# Patient Record
Sex: MALE | Race: WHITE | NOT HISPANIC OR LATINO | Employment: OTHER | URBAN - NONMETROPOLITAN AREA
[De-identification: names, ages, dates, MRNs, and addresses within clinical notes are randomized per-mention and may not be internally consistent; named-entity substitution may affect disease eponyms.]

---

## 2023-09-21 ENCOUNTER — OFFICE VISIT (OUTPATIENT)
Dept: CARDIOLOGY | Facility: CLINIC | Age: 83
End: 2023-09-21
Payer: MEDICARE

## 2023-09-21 VITALS
SYSTOLIC BLOOD PRESSURE: 160 MMHG | HEART RATE: 51 BPM | HEIGHT: 73 IN | BODY MASS INDEX: 30.35 KG/M2 | DIASTOLIC BLOOD PRESSURE: 90 MMHG | WEIGHT: 229 LBS

## 2023-09-21 DIAGNOSIS — E88.81 METABOLIC SYNDROME: ICD-10-CM

## 2023-09-21 DIAGNOSIS — I10 PRIMARY HYPERTENSION: Primary | ICD-10-CM

## 2023-09-21 DIAGNOSIS — T50.905A BRADYCARDIA, DRUG INDUCED: ICD-10-CM

## 2023-09-21 DIAGNOSIS — R06.02 SHORTNESS OF BREATH: ICD-10-CM

## 2023-09-21 DIAGNOSIS — R00.1 BRADYCARDIA, DRUG INDUCED: ICD-10-CM

## 2023-09-21 DIAGNOSIS — R07.2 PRECORDIAL PAIN: ICD-10-CM

## 2023-09-21 DIAGNOSIS — E78.00 HYPERCHOLESTEROLEMIA: ICD-10-CM

## 2023-09-21 PROBLEM — E88.810 METABOLIC SYNDROME: Status: ACTIVE | Noted: 2023-09-21

## 2023-09-21 RX ORDER — DILTIAZEM HYDROCHLORIDE 360 MG/1
360 CAPSULE, EXTENDED RELEASE ORAL DAILY
COMMUNITY

## 2023-09-21 RX ORDER — CLONIDINE HYDROCHLORIDE 0.1 MG/1
0.2 TABLET ORAL DAILY
COMMUNITY
End: 2023-09-21

## 2023-09-21 RX ORDER — CLONIDINE HYDROCHLORIDE 0.1 MG/1
0.2 TABLET ORAL DAILY
Status: SHIPPED | COMMUNITY
Start: 2023-09-21

## 2023-09-21 RX ORDER — TRIAMTERENE AND HYDROCHLOROTHIAZIDE 75; 50 MG/1; MG/1
1 TABLET ORAL DAILY
COMMUNITY

## 2023-09-21 RX ORDER — VALSARTAN 320 MG/1
320 TABLET ORAL DAILY
COMMUNITY

## 2023-09-21 RX ORDER — MONTELUKAST SODIUM 10 MG/1
10 TABLET ORAL NIGHTLY
COMMUNITY

## 2023-09-21 RX ORDER — MINOXIDIL 10 MG/1
10 TABLET ORAL DAILY
Qty: 30 TABLET | Refills: 6 | Status: SHIPPED | OUTPATIENT
Start: 2023-09-21

## 2023-09-21 RX ORDER — ATORVASTATIN CALCIUM 10 MG/1
10 TABLET, FILM COATED ORAL DAILY
COMMUNITY

## 2023-09-21 RX ORDER — RAMIPRIL 10 MG/1
20 CAPSULE ORAL DAILY
COMMUNITY

## 2023-09-21 RX ORDER — ASPIRIN 81 MG/1
81 TABLET ORAL DAILY
COMMUNITY

## 2023-09-21 NOTE — PROGRESS NOTES
"Chief Complaint   Patient presents with    Establish Care     PCP referred, uncontrolled BP with history of bradycardia, was seen here several years ago.    Hypertension     Diagnosed with HTN in '87, has had numerous med regimens, PCP has gradually had to add and increase med. Last added Clonidine then increased it.  \" Dr Johnson said I was maxed out on my meds now\". Yesterday BP was 165/82 before medication, will go down to 150's/ 70's.     Weight Loss     Has been trying to loose weight.  Eating one meal a day. Has lost 10-12 lbs over the past month. Walking 3 miles daily.     Chest Pain     Can occur while walking. Sharp pain in left chest, lasts a few seconds. Will sometimes occur after laying down at night. Has had these symptoms for several years.     Shortness of Breath     With exertion    Cardiac history     Stress and echo in 2005, echo in 2012, no testing since 2012, has never had a renal artery US that he is aware of.     Slow Heart Rate     Consistently runs around 50.     Labs     Pt brought copy of most recent labs from July        CARDIAC COMPLAINTS  chest pressure/discomfort and dyspnea      Subjective   Blayne Hunt is a 83 y.o. male came in today for his initial cardiac evaluation.  He has history of hypertension, hypercholesterolemia, history of prostate cancer was seen in 2005 and again in 2012 for cardiac management.  He presented initially with chest pain and bradycardia and both his clonidine and Cardizem was stopped and he was put on Diovan and triamterene.  Later in 2012 he was referred back for chest pain and shortness of breath and found him to be bradycardic and also noted that he was on Cardizem again along with ACE inhibitors and an ARB.  He had an echocardiogram which showed LV function was normal.  He also has dilated aortic root.  His stress test done at that time also was normal.  I have not seen him since then.    About few months ago he started noticing increasing in his blood " pressure.  Clonidine was added and increased the dose but his blood pressure continued to be elevated.  He also continued to be bradycardic though he was asymptomatic.  He has been having some chest pain on exertion.  He was trying to lose weight and eats 1 meal a day and walks about 3 miles a day.  He has lost about 10 pounds over the last 1 month.  When he does his walking he has been noticing sharp pain in the left part of the chest lasting for few seconds which gets better after taking rest.  He also started noticing some mild increase in shortness of breath.  He did undergo lab work and he brought the results with him.  His blood sugar was 112.  His renal function was slightly abnormal with GFR of 56.  His cholesterol is 140 with the LDL of 88.  His blood count was normal.  His TSH was normal.  His PSA was normal.  He quit smoking in 1980.  His father had premature coronary artery disease.    Past Surgical History:   Procedure Laterality Date    CARDIOVASCULAR STRESS TEST  12/08/2005    @ [facility].10.38 Min. 244/78. R/O Septal Infarct.    CARDIOVASCULAR STRESS TEST  04/11/2012    9.30 Min. 13.5 METS. 87% THR. 190/72. EF > 60%. Negative    ECHO - CONVERTED  12/07/2005    @[facility]. EF 65%. AO- 3.8    ECHO - CONVERTED  03/15/2012    EF 65%. AO- 4.0       Current Outpatient Medications   Medication Sig Dispense Refill    aspirin 81 MG EC tablet Take 1 tablet by mouth Daily.      atorvastatin (LIPITOR) 10 MG tablet Take 1 tablet by mouth Daily.      cloNIDine (CATAPRES) 0.1 MG tablet Take 2 tablets by mouth Daily. Reduce to 1 Tab at night till SBP comes down to 130 and then stop      dilTIAZem CD (CARDIZEM CD) 360 MG 24 hr capsule Take 1 capsule by mouth Daily.      montelukast (SINGULAIR) 10 MG tablet Take 1 tablet by mouth Every Night.      ramipril (ALTACE) 10 MG capsule Take 2 capsules by mouth Daily.      triamterene-hydrochlorothiazide (MAXZIDE) 75-50 MG per tablet Take 1 tablet by mouth Daily.      valsartan  (DIOVAN) 320 MG tablet Take 1 tablet by mouth Daily.      minoxidil (LONITEN) 10 MG tablet Take 1 tablet by mouth Daily. 30 tablet 6     No current facility-administered medications for this visit.           ALLERGIES:  Patient has no known allergies.    Past Medical History:   Diagnosis Date    Cancer     History of foot surgery     Hx of prostatectomy     Hyperlipidemia     Hypertension     Rheumatic fever     Seasonal allergies        Social History     Tobacco Use   Smoking Status Former    Packs/day: 1.00    Years: 15.00    Pack years: 15.00    Types: Cigarettes    Quit date:     Years since quittin.7   Smokeless Tobacco Never          Family History   Problem Relation Age of Onset    Hodgkin's lymphoma Mother     Heart disease Father     Other Other         7 siblings, one sister has AFib    Other Other         grandparents medical history unknown    Hypertension Son     Hypertension Son     Hypertension Son        Review of Systems   Constitutional: Negative for decreased appetite and malaise/fatigue.   HENT:  Negative for congestion and sore throat.    Eyes:  Negative for blurred vision, double vision and visual disturbance.   Cardiovascular:  Positive for chest pain and dyspnea on exertion.   Respiratory:  Positive for shortness of breath. Negative for snoring.    Endocrine: Negative for cold intolerance and heat intolerance.   Hematologic/Lymphatic: Negative for adenopathy. Does not bruise/bleed easily.   Skin:  Negative for itching, nail changes and skin cancer.   Musculoskeletal:  Negative for arthritis and myalgias.   Gastrointestinal:  Negative for abdominal pain, dysphagia and heartburn.   Genitourinary:  Negative for bladder incontinence and frequency.   Neurological:  Negative for dizziness, seizures and vertigo.   Psychiatric/Behavioral:  Negative for altered mental status.    Allergic/Immunologic: Negative for environmental allergies and hives.     Diabetes- No  Thyroid-  "normal    Objective     /90 (BP Location: Left arm)   Pulse 51   Ht 185.4 cm (73\")   Wt 104 kg (229 lb)   BMI 30.21 kg/m²     Vitals and nursing note reviewed.   Constitutional:       Appearance: Healthy appearance. Not in distress.   Eyes:      Conjunctiva/sclera: Conjunctivae normal.      Pupils: Pupils are equal, round, and reactive to light.   HENT:      Head: Normocephalic.   Pulmonary:      Effort: Pulmonary effort is normal.      Breath sounds: Normal breath sounds.   Cardiovascular:      PMI at left midclavicular line. Bradycardia present. Regular rhythm.      Murmurs: There is a grade 3/6 high frequency blowing holosystolic murmur at the apex.   Abdominal:      General: Bowel sounds are normal.      Palpations: Abdomen is soft.   Musculoskeletal: Normal range of motion.      Cervical back: Normal range of motion and neck supple. Skin:     General: Skin is warm and dry.   Neurological:      Mental Status: Alert, oriented to person, place, and time and oriented to person, place and time.       ECG 12 Lead    Date/Time: 9/21/2023 12:45 PM  Performed by: Jillian Tipton MD  Authorized by: Jillian Tipton MD   Comparison: compared with previous ECG from 8/18/2023  Comparison to previous ECG: Axis is normal  Rhythm: sinus bradycardia  Rate: bradycardic  Q waves: II, aVF, V6 and V5    QRS axis: normal    Clinical impression: abnormal EKG        @ASSESSMENT/PLAN@  BMI is >= 30 and <35. (Class 1 Obesity). The following options were offered after discussion;: weight loss educational material (shared in after visit summary), exercise counseling/recommendations, and nutrition counseling/recommendations     Diagnoses and all orders for this visit:    1. Primary hypertension (Primary)  -     US Renal Artery Complete; Future  -     minoxidil (LONITEN) 10 MG tablet; Take 1 tablet by mouth Daily.  Dispense: 30 tablet; Refill: 6    2. Hypercholesterolemia    3. Precordial pain  -     Stress Test With " Myocardial Perfusion One Day; Future    4. Shortness of breath  -     Adult Transthoracic Echo Complete W/ Cont if Necessary Per Protocol; Future    5. Bradycardia, drug induced    6. Metabolic syndrome    At baseline is bradycardic.  His blood pressure is still markedly elevated.  His EKG shows sinus bradycardia Q waves in the anterolateral leads.  His clinical examination reveals BMI of 30.  His cardiovascular examination reveals systolic murmur at the mitral area with no significant edema.    Regarding his hypertension which is predominant problem it is still elevated in spite of taking Cardizem CD at 360, clonidine at 0.2 mg, Diovan at 320 and Maxzide at 75/50.  At this time I added minoxidil 10 mg once a day.  Advised him to reduce the dose of clonidine 2.1 mg.  Advised him to check his blood pressure every day.  Once his systolic blood pressure comes close to 130, then he is advised to stop clonidine.  I also talked with him and his wife about renal artery stenosis.  I advised him to have renal arterial Doppler ultrasound to rule it out    Regarding his history of hypercholesterolemia, he is on Lipitor and his cholesterol is well controlled so continue the same    Regarding the chest pain, it could be related to blood pressure but given his risk factors need to rule out CAD.  I scheduled him to undergo a stress test to evaluate his functional status, chronotropic response, blood pressure response and to rule out any stress-induced ischemia causing the problem    Regarding his shortness of breath, it could be related to his metabolic syndrome but need to rule out cardiac cause.  I scheduled him to undergo an echocardiogram to evaluate for LVH, LV function, aortic root measurement    Regarding his metabolic syndrome, talked with him about diet and weight reduction and gave him papers on Mediterranean diet    Regarding his bradycardia, once we stop the clonidine then we can try reducing the dose of Cardizem  CD.    Regarding advanced directive, talked to him and his wife about living will and power of .  I gave them booklets regarding that    Based on the results, further recommendations will be made               Electronically signed by Jillian Tipton MD September 21, 2023 12:31 EDT

## 2023-09-21 NOTE — LETTER
"September 21, 2023     Zander Johnson MD  [address] [place] [address]    Patient: Blayne Hunt   YOB: 1940   Date of Visit: 9/21/2023     Dear Zander Johnson MD:       Thank you for referring Blayne Hunt to me for evaluation. Below are the relevant portions of my assessment and plan of care.    If you have questions, please do not hesitate to call me. I look forward to following Blayne along with you.         Sincerely,        Jillian Tipton MD        CC: No Recipients    Jillian Tipton MD  09/21/23 1246  Sign when Signing Visit  Chief Complaint   Patient presents with   • Establish Care     PCP referred, uncontrolled BP with history of bradycardia, was seen here several years ago.   • Hypertension     Diagnosed with HTN in '87, has had numerous med regimens, PCP has gradually had to add and increase med. Last added Clonidine then increased it.  \" Dr Johnson said I was maxed out on my meds now\". Yesterday BP was 165/82 before medication, will go down to 150's/ 70's.    • Weight Loss     Has been trying to loose weight.  Eating one meal a day. Has lost 10-12 lbs over the past month. Walking 3 miles daily.    • Chest Pain     Can occur while walking. Sharp pain in left chest, lasts a few seconds. Will sometimes occur after laying down at night. Has had these symptoms for several years.    • Shortness of Breath     With exertion   • Cardiac history     Stress and echo in 2005, echo in 2012, no testing since 2012, has never had a renal artery US that he is aware of.    • Slow Heart Rate     Consistently runs around 50.    • Labs     Pt brought copy of most recent labs from July        CARDIAC COMPLAINTS  chest pressure/discomfort and dyspnea      Subjective   Blayne Hunt is a 83 y.o. male came in today for his initial cardiac evaluation.  He has history of hypertension, hypercholesterolemia, history of prostate cancer was seen in 2005 and again in 2012 for cardiac management.  He " presented initially with chest pain and bradycardia and both his clonidine and Cardizem was stopped and he was put on Diovan and triamterene.  Later in 2012 he was referred back for chest pain and shortness of breath and found him to be bradycardic and also noted that he was on Cardizem again along with ACE inhibitors and an ARB.  He had an echocardiogram which showed LV function was normal.  He also has dilated aortic root.  His stress test done at that time also was normal.  I have not seen him since then.    About few months ago he started noticing increasing in his blood pressure.  Clonidine was added and increased the dose but his blood pressure continued to be elevated.  He also continued to be bradycardic though he was asymptomatic.  He has been having some chest pain on exertion.  He was trying to lose weight and eats 1 meal a day and walks about 3 miles a day.  He has lost about 10 pounds over the last 1 month.  When he does his walking he has been noticing sharp pain in the left part of the chest lasting for few seconds which gets better after taking rest.  He also started noticing some mild increase in shortness of breath.  He did undergo lab work and he brought the results with him.  His blood sugar was 112.  His renal function was slightly abnormal with GFR of 56.  His cholesterol is 140 with the LDL of 88.  His blood count was normal.  His TSH was normal.  His PSA was normal.  He quit smoking in 1980.  His father had premature coronary artery disease.    Past Surgical History:   Procedure Laterality Date   • CARDIOVASCULAR STRESS TEST  12/08/2005    @ [facility].10.38 Min. 244/78. R/O Septal Infarct.   • CARDIOVASCULAR STRESS TEST  04/11/2012    9.30 Min. 13.5 METS. 87% THR. 190/72. EF > 60%. Negative   • ECHO - CONVERTED  12/07/2005    @[facility]. EF 65%. AO- 3.8   • ECHO - CONVERTED  03/15/2012    EF 65%. AO- 4.0       Current Outpatient Medications   Medication Sig Dispense Refill   • aspirin 81 MG EC tablet  Take 1 tablet by mouth Daily.     • atorvastatin (LIPITOR) 10 MG tablet Take 1 tablet by mouth Daily.     • cloNIDine (CATAPRES) 0.1 MG tablet Take 2 tablets by mouth Daily. Reduce to 1 Tab at night till SBP comes down to 130 and then stop     • dilTIAZem CD (CARDIZEM CD) 360 MG 24 hr capsule Take 1 capsule by mouth Daily.     • montelukast (SINGULAIR) 10 MG tablet Take 1 tablet by mouth Every Night.     • ramipril (ALTACE) 10 MG capsule Take 2 capsules by mouth Daily.     • triamterene-hydrochlorothiazide (MAXZIDE) 75-50 MG per tablet Take 1 tablet by mouth Daily.     • valsartan (DIOVAN) 320 MG tablet Take 1 tablet by mouth Daily.     • minoxidil (LONITEN) 10 MG tablet Take 1 tablet by mouth Daily. 30 tablet 6     No current facility-administered medications for this visit.           ALLERGIES:  Patient has no known allergies.    Past Medical History:   Diagnosis Date   • Cancer    • History of foot surgery    • Hx of prostatectomy    • Hyperlipidemia    • Hypertension    • Rheumatic fever    • Seasonal allergies        Social History     Tobacco Use   Smoking Status Former   • Packs/day: 1.00   • Years: 15.00   • Pack years: 15.00   • Types: Cigarettes   • Quit date:    • Years since quittin.7   Smokeless Tobacco Never          Family History   Problem Relation Age of Onset   • Hodgkin's lymphoma Mother    • Heart disease Father    • Other Other         7 siblings, one sister has AFib   • Other Other         grandparents medical history unknown   • Hypertension Son    • Hypertension Son    • Hypertension Son        Review of Systems   Constitutional: Negative for decreased appetite and malaise/fatigue.   HENT:  Negative for congestion and sore throat.    Eyes:  Negative for blurred vision, double vision and visual disturbance.   Cardiovascular:  Positive for chest pain and dyspnea on exertion.   Respiratory:  Positive for shortness of breath. Negative for snoring.    Endocrine: Negative for cold  "intolerance and heat intolerance.   Hematologic/Lymphatic: Negative for adenopathy. Does not bruise/bleed easily.   Skin:  Negative for itching, nail changes and skin cancer.   Musculoskeletal:  Negative for arthritis and myalgias.   Gastrointestinal:  Negative for abdominal pain, dysphagia and heartburn.   Genitourinary:  Negative for bladder incontinence and frequency.   Neurological:  Negative for dizziness, seizures and vertigo.   Psychiatric/Behavioral:  Negative for altered mental status.    Allergic/Immunologic: Negative for environmental allergies and hives.     Diabetes- No  Thyroid- normal    Objective     /90 (BP Location: Left arm)   Pulse 51   Ht 185.4 cm (73\")   Wt 104 kg (229 lb)   BMI 30.21 kg/m²     Vitals and nursing note reviewed.   Constitutional:       Appearance: Healthy appearance. Not in distress.   Eyes:      Conjunctiva/sclera: Conjunctivae normal.      Pupils: Pupils are equal, round, and reactive to light.   HENT:      Head: Normocephalic.   Pulmonary:      Effort: Pulmonary effort is normal.      Breath sounds: Normal breath sounds.   Cardiovascular:      PMI at left midclavicular line. Bradycardia present. Regular rhythm.      Murmurs: There is a grade 3/6 high frequency blowing holosystolic murmur at the apex.   Abdominal:      General: Bowel sounds are normal.      Palpations: Abdomen is soft.   Musculoskeletal: Normal range of motion.      Cervical back: Normal range of motion and neck supple. Skin:     General: Skin is warm and dry.   Neurological:      Mental Status: Alert, oriented to person, place, and time and oriented to person, place and time.       ECG 12 Lead    Date/Time: 9/21/2023 12:45 PM  Performed by: Jillian Tipton MD  Authorized by: Jillian Tipton MD   Comparison: compared with previous ECG from 8/18/2023  Comparison to previous ECG: Axis is normal  Rhythm: sinus bradycardia  Rate: bradycardic  Q waves: II, aVF, V6 and V5    QRS axis: " normal    Clinical impression: abnormal EKG        @ASSESSMENT/PLAN@  BMI is >= 30 and <35. (Class 1 Obesity). The following options were offered after discussion;: weight loss educational material (shared in after visit summary), exercise counseling/recommendations, and nutrition counseling/recommendations     Diagnoses and all orders for this visit:    1. Primary hypertension (Primary)  -     US Renal Artery Complete; Future  -     minoxidil (LONITEN) 10 MG tablet; Take 1 tablet by mouth Daily.  Dispense: 30 tablet; Refill: 6    2. Hypercholesterolemia    3. Precordial pain  -     Stress Test With Myocardial Perfusion One Day; Future    4. Shortness of breath  -     Adult Transthoracic Echo Complete W/ Cont if Necessary Per Protocol; Future    5. Bradycardia, drug induced    6. Metabolic syndrome    At baseline is bradycardic.  His blood pressure is still markedly elevated.  His EKG shows sinus bradycardia Q waves in the anterolateral leads.  His clinical examination reveals BMI of 30.  His cardiovascular examination reveals systolic murmur at the mitral area with no significant edema.    Regarding his hypertension which is predominant problem it is still elevated in spite of taking Cardizem CD at 360, clonidine at 0.2 mg, Diovan at 320 and Maxzide at 75/50.  At this time I added minoxidil 10 mg once a day.  Advised him to reduce the dose of clonidine 2.1 mg.  Advised him to check his blood pressure every day.  Once his systolic blood pressure comes close to 130, then he is advised to stop clonidine.  I also talked with him and his wife about renal artery stenosis.  I advised him to have renal arterial Doppler ultrasound to rule it out    Regarding his history of hypercholesterolemia, he is on Lipitor and his cholesterol is well controlled so continue the same    Regarding the chest pain, it could be related to blood pressure but given his risk factors need to rule out CAD.  I scheduled him to undergo a stress  test to evaluate his functional status, chronotropic response, blood pressure response and to rule out any stress-induced ischemia causing the problem    Regarding his shortness of breath, it could be related to his metabolic syndrome but need to rule out cardiac cause.  I scheduled him to undergo an echocardiogram to evaluate for LVH, LV function, aortic root measurement    Regarding his metabolic syndrome, talked with him about diet and weight reduction and gave him papers on Mediterranean diet    Regarding his bradycardia, once we stop the clonidine then we can try reducing the dose of Cardizem CD.    Regarding advanced directive, talked to him and his wife about living will and power of .  I gave them booklets regarding that    Based on the results, further recommendations will be made               Electronically signed by Jillian Tipton MD September 21, 2023 12:31 EDT

## 2023-09-28 ENCOUNTER — HOSPITAL ENCOUNTER (OUTPATIENT)
Dept: CARDIOLOGY | Facility: HOSPITAL | Age: 83
Discharge: HOME | End: 2023-09-28
Payer: MEDICARE

## 2023-09-28 DIAGNOSIS — R07.2 PRECORDIAL PAIN: ICD-10-CM

## 2023-09-28 DIAGNOSIS — I10 PRIMARY HYPERTENSION: ICD-10-CM

## 2023-09-28 DIAGNOSIS — R06.02 SHORTNESS OF BREATH: ICD-10-CM

## 2023-09-28 PROCEDURE — 93306 TTE W/DOPPLER COMPLETE: CPT

## 2023-09-28 PROCEDURE — A9500 TC99M SESTAMIBI: HCPCS | Performed by: INTERNAL MEDICINE

## 2023-09-28 PROCEDURE — 0 TECHNETIUM SESTAMIBI: Performed by: INTERNAL MEDICINE

## 2023-09-28 PROCEDURE — 93017 CV STRESS TEST TRACING ONLY: CPT

## 2023-09-28 PROCEDURE — 93975 VASCULAR STUDY: CPT

## 2023-09-28 PROCEDURE — 78452 HT MUSCLE IMAGE SPECT MULT: CPT

## 2023-09-28 RX ADMIN — TECHNETIUM TC 99M SESTAMIBI 1 DOSE: 1 INJECTION INTRAVENOUS at 10:15

## 2023-09-28 RX ADMIN — TECHNETIUM TC 99M SESTAMIBI 1 DOSE: 1 INJECTION INTRAVENOUS at 12:08

## 2023-09-29 DIAGNOSIS — I70.1 RENAL ARTERY STENOSIS: ICD-10-CM

## 2023-09-29 DIAGNOSIS — I10 PRIMARY HYPERTENSION: Primary | ICD-10-CM

## 2023-09-29 LAB
BH CV ECHO MEAS - ACS: 2.2 CM
BH CV ECHO MEAS - AO MAX PG: 12.3 MMHG
BH CV ECHO MEAS - AO MEAN PG: 7 MMHG
BH CV ECHO MEAS - AO ROOT DIAM: 4.1 CM
BH CV ECHO MEAS - AO V2 MAX: 175 CM/SEC
BH CV ECHO MEAS - AO V2 VTI: 38.8 CM
BH CV ECHO MEAS - EDV(CUBED): 64 ML
BH CV ECHO MEAS - EDV(MOD-SP4): 105 ML
BH CV ECHO MEAS - EF(MOD-SP4): 55.8 %
BH CV ECHO MEAS - ESV(CUBED): 10.8 ML
BH CV ECHO MEAS - ESV(MOD-SP4): 46.4 ML
BH CV ECHO MEAS - FS: 44.7 %
BH CV ECHO MEAS - IVS/LVPW: 1.24 CM
BH CV ECHO MEAS - IVSD: 1.86 CM
BH CV ECHO MEAS - LA DIMENSION: 3.9 CM
BH CV ECHO MEAS - LAT PEAK E' VEL: 7.5 CM/SEC
BH CV ECHO MEAS - LV DIASTOLIC VOL/BSA (35-75): 46.1 CM2
BH CV ECHO MEAS - LV MASS(C)D: 279.1 GRAMS
BH CV ECHO MEAS - LV SYSTOLIC VOL/BSA (12-30): 20.4 CM2
BH CV ECHO MEAS - LVIDD: 4 CM
BH CV ECHO MEAS - LVIDS: 2.21 CM
BH CV ECHO MEAS - LVPWD: 1.5 CM
BH CV ECHO MEAS - MED PEAK E' VEL: 5.1 CM/SEC
BH CV ECHO MEAS - MV A MAX VEL: 111 CM/SEC
BH CV ECHO MEAS - MV DEC TIME: 0.28 SEC
BH CV ECHO MEAS - MV E MAX VEL: 70.3 CM/SEC
BH CV ECHO MEAS - MV E/A: 0.63
BH CV ECHO MEAS - RAP SYSTOLE: 10 MMHG
BH CV ECHO MEAS - RVSP: 19.7 MMHG
BH CV ECHO MEAS - SI(MOD-SP4): 25.7 ML/M2
BH CV ECHO MEAS - SV(MOD-SP4): 58.6 ML
BH CV ECHO MEAS - TR MAX PG: 9.7 MMHG
BH CV ECHO MEAS - TR MAX VEL: 156.1 CM/SEC
BH CV ECHO MEASUREMENTS AVERAGE E/E' RATIO: 11.16
BH CV REST NUCLEAR ISOTOPE DOSE: 10 MCI
BH CV STRESS COMMENTS STAGE 1: NORMAL
BH CV STRESS DOSE REGADENOSON STAGE 1: 0.4
BH CV STRESS DURATION MIN STAGE 1: 0
BH CV STRESS DURATION SEC STAGE 1: 10
BH CV STRESS NUCLEAR ISOTOPE DOSE: 30 MCI
BH CV STRESS PROTOCOL 1: NORMAL
BH CV STRESS RECOVERY BP: NORMAL MMHG
BH CV STRESS RECOVERY HR: 62 BPM
BH CV STRESS STAGE 1: 1
BH CV XLRA - RV BASE: 4.3 CM
BH CV XLRA - RV LENGTH: 10.6 CM
BH CV XLRA - RV MID: 3.3 CM
LEFT ATRIUM VOLUME INDEX: 18.4 ML/M2
LV EF NUC BP: 63 %
MAXIMAL PREDICTED HEART RATE: 139 BPM
PERCENT MAX PREDICTED HR: 47.48 %
STRESS BASELINE BP: NORMAL MMHG
STRESS BASELINE HR: 55 BPM
STRESS PERCENT HR: 56 %
STRESS POST PEAK BP: NORMAL MMHG
STRESS POST PEAK HR: 66 BPM
STRESS TARGET HR: 118 BPM

## 2023-10-16 ENCOUNTER — TELEPHONE (OUTPATIENT)
Dept: CARDIOLOGY | Facility: CLINIC | Age: 83
End: 2023-10-16
Payer: MEDICARE

## 2023-10-16 DIAGNOSIS — I10 PRIMARY HYPERTENSION: Primary | ICD-10-CM

## 2023-10-16 DIAGNOSIS — I70.1 RENAL ARTERY STENOSIS: ICD-10-CM

## 2023-10-16 NOTE — TELEPHONE ENCOUNTER
Lab order along with order for CTA and authorization was faxed to [facility].

## 2023-10-23 ENCOUNTER — TELEPHONE (OUTPATIENT)
Dept: CARDIOLOGY | Facility: CLINIC | Age: 83
End: 2023-10-23
Payer: MEDICARE

## 2023-10-23 NOTE — TELEPHONE ENCOUNTER
Caller: ROBERT RODARTE    Relationship: Emergency Contact    Best call back number: [phone]     What is the best time to reach you: ANYTIME     Do you know the name of the person who called: NILAY     What was the call regarding: PT REPORTS THAT THEY WERE GIVEN A CALL ON FRIDAY  BUT THERE IS NO DOCUMENTATION IN THE CHART ABOUT THIS. IF THIS WAS NOT A MISTAKE, PLEASE CALL PT BACK.     STATES THIS MAY HAVE BEEN ABOUT TEST RESULTS.     Is it okay if the provider responds through MyChart: CALL

## 2023-12-14 ENCOUNTER — OFFICE VISIT (OUTPATIENT)
Dept: CARDIOLOGY | Facility: CLINIC | Age: 83
End: 2023-12-14
Payer: MEDICARE

## 2023-12-14 VITALS
DIASTOLIC BLOOD PRESSURE: 80 MMHG | HEIGHT: 73 IN | SYSTOLIC BLOOD PRESSURE: 148 MMHG | BODY MASS INDEX: 32.26 KG/M2 | WEIGHT: 243.4 LBS | HEART RATE: 56 BPM

## 2023-12-14 DIAGNOSIS — T50.905A BRADYCARDIA, DRUG INDUCED: ICD-10-CM

## 2023-12-14 DIAGNOSIS — E78.00 HYPERCHOLESTEROLEMIA: ICD-10-CM

## 2023-12-14 DIAGNOSIS — R00.1 BRADYCARDIA, DRUG INDUCED: ICD-10-CM

## 2023-12-14 DIAGNOSIS — R06.02 SHORTNESS OF BREATH: ICD-10-CM

## 2023-12-14 DIAGNOSIS — E88.810 METABOLIC SYNDROME: ICD-10-CM

## 2023-12-14 DIAGNOSIS — R07.2 PRECORDIAL PAIN: ICD-10-CM

## 2023-12-14 DIAGNOSIS — I10 PRIMARY HYPERTENSION: Primary | ICD-10-CM

## 2023-12-14 RX ORDER — ATORVASTATIN CALCIUM 10 MG/1
10 TABLET, FILM COATED ORAL DAILY
Qty: 90 TABLET | Refills: 3 | Status: SHIPPED | OUTPATIENT
Start: 2023-12-14

## 2023-12-14 RX ORDER — TRIAMTERENE AND HYDROCHLOROTHIAZIDE 75; 50 MG/1; MG/1
1 TABLET ORAL DAILY
Qty: 90 TABLET | Refills: 3 | Status: SHIPPED | OUTPATIENT
Start: 2023-12-14

## 2023-12-14 RX ORDER — RAMIPRIL 10 MG/1
20 CAPSULE ORAL DAILY
Qty: 90 CAPSULE | Refills: 3 | Status: SHIPPED | OUTPATIENT
Start: 2023-12-14

## 2023-12-14 RX ORDER — VALSARTAN 320 MG/1
320 TABLET ORAL DAILY
Qty: 90 TABLET | Refills: 3 | Status: SHIPPED | OUTPATIENT
Start: 2023-12-14

## 2023-12-14 RX ORDER — MONTELUKAST SODIUM 10 MG/1
10 TABLET ORAL NIGHTLY
Qty: 90 TABLET | Refills: 3 | Status: SHIPPED | OUTPATIENT
Start: 2023-12-14

## 2023-12-14 RX ORDER — MINOXIDIL 10 MG/1
10 TABLET ORAL 2 TIMES DAILY
Qty: 180 TABLET | Refills: 3 | Status: SHIPPED | OUTPATIENT
Start: 2023-12-14 | End: 2023-12-18 | Stop reason: DRUGHIGH

## 2023-12-14 RX ORDER — DILTIAZEM HYDROCHLORIDE 180 MG/1
180 CAPSULE, COATED, EXTENDED RELEASE ORAL DAILY
Qty: 90 CAPSULE | Refills: 3 | Status: SHIPPED | OUTPATIENT
Start: 2023-12-14 | End: 2023-12-18 | Stop reason: ALTCHOICE

## 2023-12-14 NOTE — PROGRESS NOTES
Chief Complaint   Patient presents with   • Follow-up     Pt is here for cardiac follow up.  Pt reports occasional sharp CP.  He states is resolves quickly.  He denies SOB, dizziness or palpitations.  Pt does take a daily ASA.     • Med Refill     Pt request 90 day refills to be sent to [facility].  Medications were verified by the pt.     • Lab Work     Pt states their last labs were in July with his PCP.         CARDIAC COMPLAINTS  chest pressure/discomfort        Subjective   Blayne Hunt is a 83 y.o. male came in today for his follow-up visit.  He was referred to us for uncontrolled hypertension.  He also was having chest pain and fatigue.  He was noted to be bradycardic.  I started him on minoxidil and started tapering clonidine.  He is completely off clonidine.  He is cardiac workup includes a stress test which did not show any ischemia.  His echocardiogram was normal.  His renal artery ultrasound showed possible left renal artery stenosis but the CTA showed it was normal.  He came today with no new symptoms.  His blood pressure is doing better but not completely normal.  He still has some occasional chest pain when the blood pressure goes up.              Cardiac History  Past Surgical History:   Procedure Laterality Date   • CARDIOVASCULAR STRESS TEST  12/08/2005    @ [facility].10.38 Min. 244/78. R/O Septal Infarct.   • CARDIOVASCULAR STRESS TEST  04/11/2012    9.30 Min. 13.5 METS. 87% THR. 190/72. EF > 60%. Negative   • CARDIOVASCULAR STRESS TEST  09/28/2023    Lexiscan- EF 63%. Diapragmatic attenuation   • ECHO - CONVERTED  12/07/2005    @[facility]. EF 65%. AO- 3.8   • ECHO - CONVERTED  03/15/2012    EF 65%. AO- 4.0   • ECHO - CONVERTED  09/28/2023    TLS. EF 60%. LA- 3.9. AO- 4.1. Trace-Mild MR. RVSP- 20 mmHg   • OTHER SURGICAL HISTORY  09/28/2023    Renal Artery US- R/O (L) Renal artery stenosis   • OTHER SURGICAL HISTORY  10/17/2023    CTA- No RA Stenosis. Gall stones       Current Outpatient Medications    Medication Sig Dispense Refill   • aspirin 81 MG EC tablet Take 1 tablet by mouth Daily.     • atorvastatin (LIPITOR) 10 MG tablet Take 1 tablet by mouth Daily. 90 tablet 3   • minoxidil (LONITEN) 10 MG tablet Take 1 tablet by mouth 2 (Two) Times a Day. 180 tablet 3   • montelukast (SINGULAIR) 10 MG tablet Take 1 tablet by mouth Every Night. 90 tablet 3   • ramipril (ALTACE) 10 MG capsule Take 2 capsules by mouth Daily. 90 capsule 3   • triamterene-hydrochlorothiazide (MAXZIDE) 75-50 MG per tablet Take 1 tablet by mouth Daily. 90 tablet 3   • valsartan (DIOVAN) 320 MG tablet Take 1 tablet by mouth Daily. 90 tablet 3   • dilTIAZem CD (Cardizem CD) 180 MG 24 hr capsule Take 1 capsule by mouth Daily. 90 capsule 3     No current facility-administered medications for this visit.       Allergies  :  Patient has no known allergies.       Past Medical History:   Diagnosis Date   • Cancer    • History of foot surgery    • Hx of prostatectomy    • Hyperlipidemia    • Hypertension    • Rheumatic fever    • Seasonal allergies        Social History     Socioeconomic History   • Marital status:    Tobacco Use   • Smoking status: Former     Packs/day: 1.00     Years: 15.00     Additional pack years: 0.00     Total pack years: 15.00     Types: Cigarettes     Quit date: 1980     Years since quittin.9   • Smokeless tobacco: Never   Vaping Use   • Vaping Use: Never used   Substance and Sexual Activity   • Alcohol use: Not Currently   • Drug use: Never       Family History   Problem Relation Age of Onset   • Hodgkin's lymphoma Mother    • Heart disease Father    • Other Other         7 siblings, one sister has AFib   • Other Other         grandparents medical history unknown   • Hypertension Son    • Hypertension Son    • Hypertension Son        Review of Systems   Constitutional: Negative for decreased appetite and malaise/fatigue.   HENT:  Negative for congestion and sore throat.    Eyes:  Negative for blurred  "vision, double vision and visual disturbance.   Cardiovascular:  Positive for chest pain. Negative for dyspnea on exertion and palpitations.   Respiratory:  Negative for shortness of breath and snoring.    Endocrine: Negative for cold intolerance and heat intolerance.   Hematologic/Lymphatic: Negative for adenopathy. Does not bruise/bleed easily.   Skin:  Negative for itching, nail changes and skin cancer.   Musculoskeletal:  Negative for arthritis and myalgias.   Gastrointestinal:  Negative for abdominal pain, dysphagia and heartburn.   Genitourinary:  Negative for bladder incontinence and frequency.   Neurological:  Negative for dizziness, seizures and vertigo.   Psychiatric/Behavioral:  Negative for altered mental status.    Allergic/Immunologic: Negative for environmental allergies and hives.     Diabetes- No  Thyroid- normal    Objective     /80 (BP Location: Left arm, Patient Position: Sitting)   Pulse 56   Ht 185.4 cm (73\")   Wt 110 kg (243 lb 6.4 oz)   BMI 32.11 kg/m²     Vitals and nursing note reviewed.   Constitutional:       Appearance: Healthy appearance. Not in distress.   Eyes:      Conjunctiva/sclera: Conjunctivae normal.      Pupils: Pupils are equal, round, and reactive to light.   HENT:      Head: Normocephalic.   Pulmonary:      Effort: Pulmonary effort is normal.      Breath sounds: Normal breath sounds.   Cardiovascular:      PMI at left midclavicular line. Bradycardia present. Regular rhythm.      Murmurs: There is a grade 2/6 high frequency blowing holosystolic murmur at the apex.   Abdominal:      General: Bowel sounds are normal.      Palpations: Abdomen is soft.   Musculoskeletal: Normal range of motion.      Cervical back: Normal range of motion and neck supple. Skin:     General: Skin is warm and dry.   Neurological:      Mental Status: Alert, oriented to person, place, and time and oriented to person, place and time.   Procedures            @ASSESSMENT/PLAN@        Diagnoses " and all orders for this visit:    1. Primary hypertension (Primary)  -     minoxidil (LONITEN) 10 MG tablet; Take 1 tablet by mouth 2 (Two) Times a Day.  Dispense: 180 tablet; Refill: 3  -     ramipril (ALTACE) 10 MG capsule; Take 2 capsules by mouth Daily.  Dispense: 90 capsule; Refill: 3  -     triamterene-hydrochlorothiazide (MAXZIDE) 75-50 MG per tablet; Take 1 tablet by mouth Daily.  Dispense: 90 tablet; Refill: 3  -     valsartan (DIOVAN) 320 MG tablet; Take 1 tablet by mouth Daily.  Dispense: 90 tablet; Refill: 3  -     dilTIAZem CD (Cardizem CD) 180 MG 24 hr capsule; Take 1 capsule by mouth Daily.  Dispense: 90 capsule; Refill: 3    2. Hypercholesterolemia  -     atorvastatin (LIPITOR) 10 MG tablet; Take 1 tablet by mouth Daily.  Dispense: 90 tablet; Refill: 3    3. Precordial pain    4. Metabolic syndrome    5. Shortness of breath  -     montelukast (SINGULAIR) 10 MG tablet; Take 1 tablet by mouth Every Night.  Dispense: 90 tablet; Refill: 3    6. Bradycardia, drug induced  -     dilTIAZem CD (Cardizem CD) 180 MG 24 hr capsule; Take 1 capsule by mouth Daily.  Dispense: 90 capsule; Refill: 3       At baseline his heart rate is still low but better than before.  His blood pressure is mildly elevated.  His BMI is still around 32.  His clinical examination is otherwise unremarkable    Regarding his hypertension, I increased the dose of minoxidil to twice a day.  We can increase it to 20 mg twice a day if needed.  Meanwhile continue Altace, Maxide and Diovan.  Regarding Cardizem CD I reduce it to 180 mg.  He is advised to call our office on Monday with his blood pressure    Regarding his hypercholesterolemia, he is on Lipitor.  Continue the same.  Need to recheck it along with LFT    Regarding the chest pain, it appears to be secondary to hypertension itself.  The frequency and intensity of the pain has come down    Regarding metabolic syndrome, talked to him again about diet    Regarding his history of  shortness of breath, he does have some mild asthma and will continue the singular    Regarding his bradycardia which is slowly getting better.  Hopefully we should be able to taper and stop the Cardizem CD    Overall cardiac status appears stable.  I will see him back in 6 months or sooner if needed                  Electronically signed by Jillian Tipton MD December 14, 2023 14:08 EST

## 2023-12-14 NOTE — LETTER
December 14, 2023       No Recipients    Patient: Blayne Hunt   YOB: 1940   Date of Visit: 12/14/2023     Dear Zander Johnson MD:       Thank you for referring Blayne Hunt to me for evaluation. Below are the relevant portions of my assessment and plan of care.    If you have questions, please do not hesitate to call me. I look forward to following Blayne along with you.         Sincerely,        Jillian Tipton MD        CC:   No Recipients    Jillian Tipton MD  12/14/23 1412  Sign when Signing Visit  Chief Complaint   Patient presents with   • Follow-up     Pt is here for cardiac follow up.  Pt reports occasional sharp CP.  He states is resolves quickly.  He denies SOB, dizziness or palpitations.  Pt does take a daily ASA.     • Med Refill     Pt request 90 day refills to be sent to [facility].  Medications were verified by the pt.     • Lab Work     Pt states their last labs were in July with his PCP.         CARDIAC COMPLAINTS  chest pressure/discomfort        Subjective  Blayne Hunt is a 83 y.o. male came in today for his follow-up visit.  He was referred to us for uncontrolled hypertension.  He also was having chest pain and fatigue.  He was noted to be bradycardic.  I started him on minoxidil and started tapering clonidine.  He is completely off clonidine.  He is cardiac workup includes a stress test which did not show any ischemia.  His echocardiogram was normal.  His renal artery ultrasound showed possible left renal artery stenosis but the CTA showed it was normal.  He came today with no new symptoms.  His blood pressure is doing better but not completely normal.  He still has some occasional chest pain when the blood pressure goes up.              Cardiac History  Past Surgical History:   Procedure Laterality Date   • CARDIOVASCULAR STRESS TEST  12/08/2005    @ [facility].10.38 Min. 244/78. R/O Septal Infarct.   • CARDIOVASCULAR STRESS TEST  04/11/2012    9.30 Min. 13.5  METS. 87% THR. 190/72. EF > 60%. Negative   • CARDIOVASCULAR STRESS TEST  2023    Lexiscan- EF 63%. Diapragmatic attenuation   • ECHO - CONVERTED  2005    @[facility]. EF 65%. AO- 3.8   • ECHO - CONVERTED  03/15/2012    EF 65%. AO- 4.0   • ECHO - CONVERTED  2023    TLS. EF 60%. LA- 3.9. AO- 4.1. Trace-Mild MR. RVSP- 20 mmHg   • OTHER SURGICAL HISTORY  2023    Renal Artery US- R/O (L) Renal artery stenosis   • OTHER SURGICAL HISTORY  10/17/2023    CTA- No RA Stenosis. Gall stones       Current Outpatient Medications   Medication Sig Dispense Refill   • aspirin 81 MG EC tablet Take 1 tablet by mouth Daily.     • atorvastatin (LIPITOR) 10 MG tablet Take 1 tablet by mouth Daily. 90 tablet 3   • minoxidil (LONITEN) 10 MG tablet Take 1 tablet by mouth 2 (Two) Times a Day. 180 tablet 3   • montelukast (SINGULAIR) 10 MG tablet Take 1 tablet by mouth Every Night. 90 tablet 3   • ramipril (ALTACE) 10 MG capsule Take 2 capsules by mouth Daily. 90 capsule 3   • triamterene-hydrochlorothiazide (MAXZIDE) 75-50 MG per tablet Take 1 tablet by mouth Daily. 90 tablet 3   • valsartan (DIOVAN) 320 MG tablet Take 1 tablet by mouth Daily. 90 tablet 3   • dilTIAZem CD (Cardizem CD) 180 MG 24 hr capsule Take 1 capsule by mouth Daily. 90 capsule 3     No current facility-administered medications for this visit.       Allergies  :  Patient has no known allergies.       Past Medical History:   Diagnosis Date   • Cancer    • History of foot surgery    • Hx of prostatectomy    • Hyperlipidemia    • Hypertension    • Rheumatic fever    • Seasonal allergies        Social History     Socioeconomic History   • Marital status:    Tobacco Use   • Smoking status: Former     Packs/day: 1.00     Years: 15.00     Additional pack years: 0.00     Total pack years: 15.00     Types: Cigarettes     Quit date: 1980     Years since quittin.9   • Smokeless tobacco: Never   Vaping Use   • Vaping Use: Never used   Substance and  "Sexual Activity   • Alcohol use: Not Currently   • Drug use: Never       Family History   Problem Relation Age of Onset   • Hodgkin's lymphoma Mother    • Heart disease Father    • Other Other         7 siblings, one sister has AFib   • Other Other         grandparents medical history unknown   • Hypertension Son    • Hypertension Son    • Hypertension Son        Review of Systems   Constitutional: Negative for decreased appetite and malaise/fatigue.   HENT:  Negative for congestion and sore throat.    Eyes:  Negative for blurred vision, double vision and visual disturbance.   Cardiovascular:  Positive for chest pain. Negative for dyspnea on exertion and palpitations.   Respiratory:  Negative for shortness of breath and snoring.    Endocrine: Negative for cold intolerance and heat intolerance.   Hematologic/Lymphatic: Negative for adenopathy. Does not bruise/bleed easily.   Skin:  Negative for itching, nail changes and skin cancer.   Musculoskeletal:  Negative for arthritis and myalgias.   Gastrointestinal:  Negative for abdominal pain, dysphagia and heartburn.   Genitourinary:  Negative for bladder incontinence and frequency.   Neurological:  Negative for dizziness, seizures and vertigo.   Psychiatric/Behavioral:  Negative for altered mental status.    Allergic/Immunologic: Negative for environmental allergies and hives.     Diabetes- No  Thyroid- normal    Objective    /80 (BP Location: Left arm, Patient Position: Sitting)   Pulse 56   Ht 185.4 cm (73\")   Wt 110 kg (243 lb 6.4 oz)   BMI 32.11 kg/m²     Vitals and nursing note reviewed.   Constitutional:       Appearance: Healthy appearance. Not in distress.   Eyes:      Conjunctiva/sclera: Conjunctivae normal.      Pupils: Pupils are equal, round, and reactive to light.   HENT:      Head: Normocephalic.   Pulmonary:      Effort: Pulmonary effort is normal.      Breath sounds: Normal breath sounds.   Cardiovascular:      PMI at left midclavicular line. " Bradycardia present. Regular rhythm.      Murmurs: There is a grade 2/6 high frequency blowing holosystolic murmur at the apex.   Abdominal:      General: Bowel sounds are normal.      Palpations: Abdomen is soft.   Musculoskeletal: Normal range of motion.      Cervical back: Normal range of motion and neck supple. Skin:     General: Skin is warm and dry.   Neurological:      Mental Status: Alert, oriented to person, place, and time and oriented to person, place and time.   Procedures            @ASSESSMENT/PLAN@        Diagnoses and all orders for this visit:    1. Primary hypertension (Primary)  -     minoxidil (LONITEN) 10 MG tablet; Take 1 tablet by mouth 2 (Two) Times a Day.  Dispense: 180 tablet; Refill: 3  -     ramipril (ALTACE) 10 MG capsule; Take 2 capsules by mouth Daily.  Dispense: 90 capsule; Refill: 3  -     triamterene-hydrochlorothiazide (MAXZIDE) 75-50 MG per tablet; Take 1 tablet by mouth Daily.  Dispense: 90 tablet; Refill: 3  -     valsartan (DIOVAN) 320 MG tablet; Take 1 tablet by mouth Daily.  Dispense: 90 tablet; Refill: 3  -     dilTIAZem CD (Cardizem CD) 180 MG 24 hr capsule; Take 1 capsule by mouth Daily.  Dispense: 90 capsule; Refill: 3    2. Hypercholesterolemia  -     atorvastatin (LIPITOR) 10 MG tablet; Take 1 tablet by mouth Daily.  Dispense: 90 tablet; Refill: 3    3. Precordial pain    4. Metabolic syndrome    5. Shortness of breath  -     montelukast (SINGULAIR) 10 MG tablet; Take 1 tablet by mouth Every Night.  Dispense: 90 tablet; Refill: 3    6. Bradycardia, drug induced  -     dilTIAZem CD (Cardizem CD) 180 MG 24 hr capsule; Take 1 capsule by mouth Daily.  Dispense: 90 capsule; Refill: 3       At baseline his heart rate is still low but better than before.  His blood pressure is mildly elevated.  His BMI is still around 32.  His clinical examination is otherwise unremarkable    Regarding his hypertension, I increased the dose of minoxidil to twice a day.  We can increase it to  20 mg twice a day if needed.  Meanwhile continue Altace, Maxide and Diovan.  Regarding Cardizem CD I reduce it to 180 mg.  He is advised to call our office on Monday with his blood pressure    Regarding his hypercholesterolemia, he is on Lipitor.  Continue the same.  Need to recheck it along with LFT    Regarding the chest pain, it appears to be secondary to hypertension itself.  The frequency and intensity of the pain has come down    Regarding metabolic syndrome, talked to him again about diet    Regarding his history of shortness of breath, he does have some mild asthma and will continue the singular    Regarding his bradycardia which is slowly getting better.  Hopefully we should be able to taper and stop the Cardizem CD    Overall cardiac status appears stable.  I will see him back in 6 months or sooner if needed                  Electronically signed by Jillian Tipton MD December 14, 2023 14:08 EST

## 2023-12-18 ENCOUNTER — TELEPHONE (OUTPATIENT)
Dept: CARDIOLOGY | Facility: CLINIC | Age: 83
End: 2023-12-18
Payer: MEDICARE

## 2023-12-18 RX ORDER — MINOXIDIL 10 MG/1
20 TABLET ORAL 2 TIMES DAILY
Qty: 360 TABLET | Refills: 3 | Status: SHIPPED | OUTPATIENT
Start: 2023-12-18

## 2023-12-18 NOTE — TELEPHONE ENCOUNTER
Patient's wife, Jessica, called to report BP readings since changing minoxidil to 10 mg BID and decreasing diltiazem CD to 180 mg daily.    Friday (12/15/23)  AM: 157/76  PM: 153/65    Saturday (12/16/23)  AM: 169/77  PM: 171/73    Sunday (12/17/23)  AM: 157/73   PM: 154/76    This morning BP was 153/69. She did have have specific HR readings, but she states that it has been in the 60s.     Current medications:  Minoxidil 10 mg BID  Diltiazem  mg daily  Ramipril 10 mg 2 capsules daily  Triamterene-HCTZ 75-50 mg daily  Valsartan 320 mg daily

## 2023-12-18 NOTE — TELEPHONE ENCOUNTER
Patient's wife, Jessica, was made aware to increase minoxidil 20 mg BID and to stop diltiazem  mg daily. Script was sent to [facility] for minoxidil 20 mg BID. She was made aware to continue checking BP at home and let us know how BP is after the first of the year.

## 2024-01-26 DIAGNOSIS — E88.810 METABOLIC SYNDROME: Primary | ICD-10-CM

## 2024-01-26 NOTE — TELEPHONE ENCOUNTER
Called Jessica, pt's wife back for further details. She said that over the past few weeks pt has had generalized edema with a weight gain of 13 lbs since being seen here Dec 14th. Has noticed a lot more SOB and having some soreness across his chest, not related to exertion. She states his BP is doing much better, consistently 140's/60's pulse 60's-70's. Jessica said she just wondered if it could be the increased dose of minoxidil. States it is working really well but wanted your thoughts on whether you think it could be the minoxidil.       Current meds include:    Minoxidil 20 mg BID  Altace 20 mg daily  Valsartan 320 mg daily   Triam/ HCTZ  75/50 mg daily

## 2024-01-26 NOTE — TELEPHONE ENCOUNTER
Patient wife called stating patient was having feet and eye swelling, weight gain, chest pain, and shortness of breath. Last blood pressure 144/64 and heart rate has been in upper 60's and 70's per patient wife.    Told patient wife to take patient to hospital if unstable and also that Svitlanali was out of office until Monday. Patient stated that patient would be okay until Monday.

## 2024-01-29 NOTE — TELEPHONE ENCOUNTER
I spoke to Jessica on Friday when the other note was written. She said that over the weekend his swelling has gotten much worse, feet are actually so swollen they are itching now. Pants are tight on his legs from the edema. Asking if the minoxidil could just be changed to something else of they feel he is going to have to go to ER.

## 2024-01-29 NOTE — TELEPHONE ENCOUNTER
Could be from minoxidil.  At this time low sodium diet will be helpful.  We can try 20 mg of Lasix a day.

## 2024-01-30 RX ORDER — SPIRONOLACTONE 50 MG/1
50 TABLET, FILM COATED ORAL DAILY
Qty: 90 TABLET | Refills: 3 | Status: SHIPPED | OUTPATIENT
Start: 2024-01-30

## 2024-01-30 NOTE — TELEPHONE ENCOUNTER
Pt's wife aware of recommendations to stop the minoxidil and start Aldactone 50 mg daily and check a BMP in 1 week as well as having a renal artery US and Venous reflux study. Pt has actually already had a renal artery US as well as a renal artery CTA. Reports in chart. Please add external venous reflux order.

## 2024-01-30 NOTE — TELEPHONE ENCOUNTER
Stop minoxidil  Start on Aldactone 50 once a day  Need BMP in 1 week  Patient also need renal artery ultrasound and venous reflux study

## 2024-02-08 ENCOUNTER — TELEPHONE (OUTPATIENT)
Dept: CARDIOLOGY | Facility: CLINIC | Age: 84
End: 2024-02-08
Payer: MEDICARE

## 2024-02-12 RX ORDER — HYDRALAZINE HYDROCHLORIDE 10 MG/1
10 TABLET, FILM COATED ORAL 4 TIMES DAILY
Qty: 360 TABLET | Refills: 3 | Status: SHIPPED | OUTPATIENT
Start: 2024-02-12

## 2024-03-12 ENCOUNTER — TELEPHONE (OUTPATIENT)
Dept: CARDIOLOGY | Facility: CLINIC | Age: 84
End: 2024-03-12
Payer: MEDICARE

## 2024-03-12 NOTE — TELEPHONE ENCOUNTER
Caller: ROBERT RODARTE    Relationship: Emergency Contact    Best call back number: [phone]    What is the best time to reach you: ANYTIME    What was the call regarding: STATES SHE IS RETURNING A CALL TO [facility] REGARDING MEDICATION, HUB UNABLE TO FIND NOTE IN CHART.

## 2024-03-13 NOTE — TELEPHONE ENCOUNTER
Spoke with Jessica, pt's wife. She said BP had not improved any after adding the Hydralazine 10 mg QID, as we had instructed she increased it to 25 mg QID.  BP now averaging 150's/80's pulse in the 70's. She is asking for script of the hydralazine 25 mg QID if you are ok with his BP at that reading.       Current meds include:    Hydralazine 25 mg QID  Diovan 320 mg daily  Triam- HCTZ 75/50 mg daily  Spironolactone 50 mg daily  Ramipril 20 mg daily

## 2024-03-14 NOTE — TELEPHONE ENCOUNTER
Send is a 50 mg 3 times daily.  She can start that after tablet and if the blood pressure is still elevated can go up to 50

## 2024-03-15 RX ORDER — HYDRALAZINE HYDROCHLORIDE 50 MG/1
50 TABLET, FILM COATED ORAL 3 TIMES DAILY
Qty: 270 TABLET | Refills: 3 | Status: SHIPPED | OUTPATIENT
Start: 2024-03-15

## 2024-03-15 NOTE — TELEPHONE ENCOUNTER
"Jessica aware of Dr Tipton's recommendations to send in the 50 mg three times a day,  \"can start that after tablet and if the blood pressure is still elevated can go up to 50\". Aware to continue to monitor and call if still having problems. Understanding voiced.   "

## 2024-06-26 ENCOUNTER — OFFICE VISIT (OUTPATIENT)
Dept: CARDIOLOGY | Facility: CLINIC | Age: 84
End: 2024-06-26
Payer: MEDICARE

## 2024-06-26 VITALS
DIASTOLIC BLOOD PRESSURE: 78 MMHG | BODY MASS INDEX: 30.69 KG/M2 | HEART RATE: 72 BPM | HEIGHT: 73 IN | WEIGHT: 231.6 LBS | SYSTOLIC BLOOD PRESSURE: 128 MMHG

## 2024-06-26 DIAGNOSIS — E78.00 HYPERCHOLESTEROLEMIA: ICD-10-CM

## 2024-06-26 DIAGNOSIS — I10 PRIMARY HYPERTENSION: Primary | ICD-10-CM

## 2024-06-26 RX ORDER — SPIRONOLACTONE 50 MG/1
50 TABLET, FILM COATED ORAL DAILY
Qty: 90 TABLET | Refills: 3 | Status: SHIPPED | OUTPATIENT
Start: 2024-06-26

## 2024-06-26 NOTE — PROGRESS NOTES
Chief Complaint   Patient presents with    Follow-up     Cardiac management    Lab     Last labs in chart. He did have eyelid lift on 5/16/24.    Blood pressure     Doing better, he had to decrease Hydralazine to 50 mg BID. He has been monitoring /61 to 154/70, most readings are prior to medications.    Dizziness     Notices with low BP readings.     Med Refill     Needs refills on Spironolactone-90 day.        HPI:  HPI   Blayne Hunt is a 84 y.o. male who was referred to us for uncontrolled hypertension.  He also was having chest pain and fatigue.  He was noted to be bradycardic. minoxidil was started and started tapering clonidine.  He is completely off clonidine.  His cardiac workup included a stress test which did not show any ischemia.  His echocardiogram was normal.  His renal artery ultrasound showed possible left renal artery stenosis but the CTA showed it was normal.      He returns today for follow-up visit. Patient denies chest pain, pressure, palpitations, tightness, dizziness, shortness of air. He reports some lightheadedness when standing. Admits he may not be adequately hydrates.     Cardiac History:    Past Surgical History:   Procedure Laterality Date    CARDIOVASCULAR STRESS TEST  12/08/2005    @ [facility].10.38 Min. 244/78. R/O Septal Infarct.    CARDIOVASCULAR STRESS TEST  04/11/2012    9.30 Min. 13.5 METS. 87% THR. 190/72. EF > 60%. Negative    CARDIOVASCULAR STRESS TEST  09/28/2023    Lexiscan- EF 63%. Diapragmatic attenuation    ECHO - CONVERTED  12/07/2005    @[facility]. EF 65%. AO- 3.8    ECHO - CONVERTED  03/15/2012    EF 65%. AO- 4.0    ECHO - CONVERTED  09/28/2023    TLS. EF 60%. LA- 3.9. AO- 4.1. Trace-Mild MR. RVSP- 20 mmHg    OTHER SURGICAL HISTORY  09/28/2023    Renal Artery US- R/O (L) Renal artery stenosis    OTHER SURGICAL HISTORY  10/17/2023    CTA- No RA Stenosis. Gall stones       Current Outpatient Medications   Medication Sig Dispense Refill    aspirin 81 MG EC tablet Take 1  tablet by mouth Daily.      atorvastatin (LIPITOR) 10 MG tablet Take 1 tablet by mouth Daily. 90 tablet 3    hydrALAZINE (APRESOLINE) 50 MG tablet Take 1 tablet by mouth 3 (Three) Times a Day. (Patient taking differently: Take 1 tablet by mouth 2 (Two) Times a Day.) 270 tablet 3    montelukast (SINGULAIR) 10 MG tablet Take 1 tablet by mouth Every Night. 90 tablet 3    ramipril (ALTACE) 10 MG capsule Take 2 capsules by mouth Daily. (Patient taking differently: Take 1 capsule by mouth 2 (Two) Times a Day.) 90 capsule 3    spironolactone (Aldactone) 50 MG tablet Take 1 tablet by mouth Daily. STOP minoxidil 90 tablet 3    triamterene-hydrochlorothiazide (MAXZIDE) 75-50 MG per tablet Take 1 tablet by mouth Daily. 90 tablet 3    valsartan (DIOVAN) 320 MG tablet Take 1 tablet by mouth Daily. 90 tablet 3     No current facility-administered medications for this visit.       Patient has no known allergies.    Past Medical History:   Diagnosis Date    Cancer     History of foot surgery     Hx of prostatectomy     Hyperlipidemia     Hypertension     Rheumatic fever     Seasonal allergies        Social History     Socioeconomic History    Marital status:    Tobacco Use    Smoking status: Former     Current packs/day: 0.00     Average packs/day: 1 pack/day for 15.0 years (15.0 ttl pk-yrs)     Types: Cigarettes     Start date: 1965     Quit date: 1980     Years since quittin.5     Passive exposure: Past    Smokeless tobacco: Never   Vaping Use    Vaping status: Never Used   Substance and Sexual Activity    Alcohol use: Not Currently    Drug use: Never    Sexual activity: Defer       Family History   Problem Relation Age of Onset    Hodgkin's lymphoma Mother     Heart disease Father     Other Other         7 siblings, one sister has AFib    Other Other         grandparents medical history unknown    Hypertension Son     Hypertension Son     Hypertension Son        Vitals:   /78 (BP Location: Left arm)    "Pulse 72   Ht 185.4 cm (72.99\")   Wt 105 kg (231 lb 9.6 oz)   BMI 30.56 kg/m²     Physical Exam  Vitals and nursing note reviewed.   Neck:      Vascular: No carotid bruit.   Cardiovascular:      Rate and Rhythm: Normal rate and regular rhythm.      Pulses: Normal pulses.      Heart sounds: Normal heart sounds. No murmur heard.     No friction rub. No gallop.   Pulmonary:      Effort: Pulmonary effort is normal.      Breath sounds: Normal breath sounds and air entry.   Musculoskeletal:      Right lower leg: No edema.      Left lower leg: No edema.   Skin:     General: Skin is warm and dry.      Capillary Refill: Capillary refill takes less than 2 seconds.   Neurological:      Mental Status: He is alert and oriented to person, place, and time.       Procedures     Assessment & Plan     Diagnoses and all orders for this visit:    1. Primary hypertension (Primary)    2. Hypercholesterolemia    Other orders  -     spironolactone (Aldactone) 50 MG tablet; Take 1 tablet by mouth Daily. STOP minoxidil  Dispense: 90 tablet; Refill: 3    Hypertension  - BP controlled  - Continue current antihypertensive medication regimen    Hypercholesteremia  - Labs managed with PCP  - Continue atorvastatin      No further testing recommended. No medication changes made today. Report any BP concerns. Stable from a cardiac standpoint.      Visit Diagnoses:    ICD-10-CM ICD-9-CM   1. Primary hypertension  I10 401.9   2. Hypercholesterolemia  E78.00 272.0       Meds Ordered During Visit:  New Medications Ordered This Visit   Medications    spironolactone (Aldactone) 50 MG tablet     Sig: Take 1 tablet by mouth Daily. STOP minoxidil     Dispense:  90 tablet     Refill:  3       Follow Up Appointment:   Return in about 6 months (around 12/26/2024), or if symptoms worsen or fail to improve.           This document has been electronically signed by JEAN CARLOS Spencer  June 30, 2024 19:50 EDT    Dictated Utilizing Dragon Dictation: Part of this " note may be an electronic transcription/translation of spoken language to printed text using the Dragon Dictation System.

## 2024-07-22 ENCOUNTER — TELEPHONE (OUTPATIENT)
Dept: CARDIOLOGY | Facility: CLINIC | Age: 84
End: 2024-07-22
Payer: MEDICARE

## 2024-07-22 NOTE — TELEPHONE ENCOUNTER
Received fax from [facility] office to relay that they had discontinued aldactone 50 mg and discontinued altace 10 mg due to patients potassium 6.1 and blood pressure at his office was 110/70. Updated patient medication list and sending to provider for review/make aware.

## 2024-07-23 ENCOUNTER — TELEPHONE (OUTPATIENT)
Dept: CARDIOLOGY | Facility: CLINIC | Age: 84
End: 2024-07-23
Payer: MEDICARE

## 2024-07-23 NOTE — TELEPHONE ENCOUNTER
Received another fax from patient primary care to relay they have also stopped patient's maxzide. They relayed patient's blood pressure at office is up to 128/72 and stated patient feels better.

## 2024-12-16 DIAGNOSIS — I10 PRIMARY HYPERTENSION: ICD-10-CM

## 2024-12-18 RX ORDER — VALSARTAN 320 MG/1
320 TABLET ORAL DAILY
Qty: 90 TABLET | Refills: 3 | OUTPATIENT
Start: 2024-12-18

## 2025-01-08 ENCOUNTER — OFFICE VISIT (OUTPATIENT)
Dept: CARDIOLOGY | Facility: CLINIC | Age: 85
End: 2025-01-08
Payer: MEDICARE

## 2025-01-08 VITALS
BODY MASS INDEX: 31.83 KG/M2 | WEIGHT: 240.2 LBS | HEIGHT: 73 IN | HEART RATE: 68 BPM | DIASTOLIC BLOOD PRESSURE: 82 MMHG | SYSTOLIC BLOOD PRESSURE: 140 MMHG

## 2025-01-08 DIAGNOSIS — E78.00 HYPERCHOLESTEROLEMIA: ICD-10-CM

## 2025-01-08 DIAGNOSIS — I10 PRIMARY HYPERTENSION: Primary | ICD-10-CM

## 2025-01-08 PROCEDURE — 3079F DIAST BP 80-89 MM HG: CPT | Performed by: NURSE PRACTITIONER

## 2025-01-08 PROCEDURE — 3077F SYST BP >= 140 MM HG: CPT | Performed by: NURSE PRACTITIONER

## 2025-01-08 PROCEDURE — 99214 OFFICE O/P EST MOD 30 MIN: CPT | Performed by: NURSE PRACTITIONER

## 2025-01-08 RX ORDER — RAMIPRIL 10 MG/1
10 CAPSULE ORAL DAILY
COMMUNITY
End: 2025-01-08 | Stop reason: SDUPTHER

## 2025-01-08 RX ORDER — TRIAMTERENE AND HYDROCHLOROTHIAZIDE 75; 50 MG/1; MG/1
1 TABLET ORAL DAILY
Qty: 90 TABLET | Refills: 3 | Status: SHIPPED | OUTPATIENT
Start: 2025-01-08

## 2025-01-08 RX ORDER — VALSARTAN 320 MG/1
320 TABLET ORAL DAILY
Qty: 90 TABLET | Refills: 3 | Status: SHIPPED | OUTPATIENT
Start: 2025-01-08

## 2025-01-08 RX ORDER — ATORVASTATIN CALCIUM 10 MG/1
10 TABLET, FILM COATED ORAL DAILY
Qty: 90 TABLET | Refills: 3 | Status: SHIPPED | OUTPATIENT
Start: 2025-01-08

## 2025-01-08 RX ORDER — TRIAMTERENE AND HYDROCHLOROTHIAZIDE 75; 50 MG/1; MG/1
1 TABLET ORAL DAILY
COMMUNITY
End: 2025-01-08 | Stop reason: SDUPTHER

## 2025-01-08 RX ORDER — HYDRALAZINE HYDROCHLORIDE 50 MG/1
50 TABLET, FILM COATED ORAL 3 TIMES DAILY
Qty: 270 TABLET | Refills: 3 | Status: SHIPPED | OUTPATIENT
Start: 2025-01-08

## 2025-01-08 RX ORDER — RAMIPRIL 10 MG/1
10 CAPSULE ORAL DAILY
Qty: 90 CAPSULE | Refills: 3 | Status: SHIPPED | OUTPATIENT
Start: 2025-01-08

## 2025-01-08 NOTE — PROGRESS NOTES
Chief Complaint   Patient presents with    Follow-up     Cardiac management    Lab     Last labs in chart.    Blood pressure     He monitors BP at home, BP has been slightly elevated prior to taking medication. 151/67 to 168/84 in early morning, he usually takes medication after waking in the morning.    Med Refill     Needs refills on cardiac medications-90 day        HPI:  HPI   Blayne Hunt is a 84 y.o. male who was referred to us for uncontrolled hypertension.  He also was having chest pain and fatigue.  He was noted to be bradycardic. minoxidil was started and started tapering clonidine.  He is completely off clonidine.  His cardiac workup included a stress test which did not show any ischemia.  His echocardiogram was normal.  His renal artery ultrasound showed possible left renal artery stenosis but the CTA showed it was normal.       He returns today for follow-up visit. Patient denies chest pain, pressure, palpitations, tightness, dizziness, shortness of air.  His blood pressure is slightly elevated today.  He claims he has been eating lots of sausage biscuits and soups.  So this winter and he has had high sodium diet.  He has been taking his blood pressure and he has been getting readings 151/67 to 168/84 but these readings have been early in the morning prior to taking his medication and taking walks.  He states that after he takes his walks his blood pressure comes down and then he takes his medicines and it usually reads in the 120s and 130s after that.  He did ask about his diet.    Cardiac History:    Past Surgical History:   Procedure Laterality Date    CARDIOVASCULAR STRESS TEST  12/08/2005    @ [facility].10.38 Min. 244/78. R/O Septal Infarct.    CARDIOVASCULAR STRESS TEST  04/11/2012    9.30 Min. 13.5 METS. 87% THR. 190/72. EF > 60%. Negative    CARDIOVASCULAR STRESS TEST  09/28/2023    Lexiscan- EF 63%. Diapragmatic attenuation    ECHO - CONVERTED  12/07/2005    @[facility]. EF 65%. AO- 3.8    ECHO -  CONVERTED  03/15/2012    EF 65%. AO- 4.0    ECHO - CONVERTED  2023    TLS. EF 60%. LA- 3.9. AO- 4.1. Trace-Mild MR. RVSP- 20 mmHg    OTHER SURGICAL HISTORY  2023    Renal Artery US- R/O (L) Renal artery stenosis    OTHER SURGICAL HISTORY  10/17/2023    CTA- No RA Stenosis. Gall stones       Current Outpatient Medications   Medication Sig Dispense Refill    aspirin 81 MG EC tablet Take 1 tablet by mouth Daily.      atorvastatin (LIPITOR) 10 MG tablet Take 1 tablet by mouth Daily. 90 tablet 3    hydrALAZINE (APRESOLINE) 50 MG tablet Take 1 tablet by mouth 3 (Three) Times a Day. 270 tablet 3    montelukast (SINGULAIR) 10 MG tablet Take 1 tablet by mouth Every Night. 90 tablet 3    ramipril (ALTACE) 10 MG capsule Take 1 capsule by mouth Daily. 90 capsule 3    triamterene-hydrochlorothiazide (MAXZIDE) 75-50 MG per tablet Take 1 tablet by mouth Daily. 90 tablet 3    valsartan (DIOVAN) 320 MG tablet Take 1 tablet by mouth Daily. 90 tablet 3     No current facility-administered medications for this visit.       Patient has no known allergies.    Past Medical History:   Diagnosis Date    Cancer     History of foot surgery     Hx of prostatectomy     Hyperlipidemia     Hypertension     Rheumatic fever     Seasonal allergies        Social History     Socioeconomic History    Marital status:    Tobacco Use    Smoking status: Former     Current packs/day: 0.00     Average packs/day: 1 pack/day for 15.0 years (15.0 ttl pk-yrs)     Types: Cigarettes     Start date: 1965     Quit date: 1980     Years since quittin.0     Passive exposure: Past    Smokeless tobacco: Never   Vaping Use    Vaping status: Never Used   Substance and Sexual Activity    Alcohol use: Not Currently    Drug use: Never    Sexual activity: Defer       Family History   Problem Relation Age of Onset    Hodgkin's lymphoma Mother     Heart disease Father     Other Other         7 siblings, one sister has AFib    Other Other          "grandparents medical history unknown    Hypertension Son     Hypertension Son     Hypertension Son        Vitals:   /82 (BP Location: Left arm, Patient Position: Sitting)   Pulse 68   Ht 185.4 cm (72.99\")   Wt 109 kg (240 lb 3.2 oz)   BMI 31.70 kg/m²     Physical Exam  Vitals and nursing note reviewed.   Neck:      Vascular: No carotid bruit.   Cardiovascular:      Rate and Rhythm: Normal rate and regular rhythm.      Pulses: Normal pulses.      Heart sounds: Murmur heard.      No friction rub. No gallop.   Pulmonary:      Effort: Pulmonary effort is normal.      Breath sounds: Normal breath sounds and air entry.   Musculoskeletal:      Right lower leg: No edema.      Left lower leg: No edema.   Skin:     General: Skin is warm and dry.      Capillary Refill: Capillary refill takes less than 2 seconds.   Neurological:      Mental Status: He is alert and oriented to person, place, and time.       Procedures     Assessment & Plan     Diagnoses and all orders for this visit:    1. Primary hypertension (Primary)  -     hydrALAZINE (APRESOLINE) 50 MG tablet; Take 1 tablet by mouth 3 (Three) Times a Day.  Dispense: 270 tablet; Refill: 3  -     ramipril (ALTACE) 10 MG capsule; Take 1 capsule by mouth Daily.  Dispense: 90 capsule; Refill: 3  -     triamterene-hydrochlorothiazide (MAXZIDE) 75-50 MG per tablet; Take 1 tablet by mouth Daily.  Dispense: 90 tablet; Refill: 3  -     valsartan (DIOVAN) 320 MG tablet; Take 1 tablet by mouth Daily.  Dispense: 90 tablet; Refill: 3    2. Hypercholesterolemia  -     atorvastatin (LIPITOR) 10 MG tablet; Take 1 tablet by mouth Daily.  Dispense: 90 tablet; Refill: 3    Hypertension  - BP slightly elevated  - Continue current antihypertensive medication regimen  - Keep a blood pressure log and notify office if blood pressure remains 140/90 or above    Hypercholesteremia  - Labs managed with PCP  - Lipids controlled  - Continue current statin therapy    Stable from a cardiac " standpoint. No further testing recommended at this time. No medication changes made today.  Refills on cardiac medications sent to pharmacy.  Recommend keeping a blood pressure log and contacting the office if his blood pressure remains 140/90 or above and we will make medication adjustments.  Recommend eliminating sugar and refined carbohydrates.  Eliminate seed oils.  Limit grains and starches.    Visit Diagnoses:    ICD-10-CM ICD-9-CM   1. Primary hypertension  I10 401.9   2. Hypercholesterolemia  E78.00 272.0       Meds Ordered During Visit:  New Medications Ordered This Visit   Medications    atorvastatin (LIPITOR) 10 MG tablet     Sig: Take 1 tablet by mouth Daily.     Dispense:  90 tablet     Refill:  3    hydrALAZINE (APRESOLINE) 50 MG tablet     Sig: Take 1 tablet by mouth 3 (Three) Times a Day.     Dispense:  270 tablet     Refill:  3    ramipril (ALTACE) 10 MG capsule     Sig: Take 1 capsule by mouth Daily.     Dispense:  90 capsule     Refill:  3    triamterene-hydrochlorothiazide (MAXZIDE) 75-50 MG per tablet     Sig: Take 1 tablet by mouth Daily.     Dispense:  90 tablet     Refill:  3    valsartan (DIOVAN) 320 MG tablet     Sig: Take 1 tablet by mouth Daily.     Dispense:  90 tablet     Refill:  3       Follow Up Appointment:   Return in about 6 months (around 7/8/2025), or if symptoms worsen or fail to improve.           This document has been electronically signed by JEAN CARLOS Spencer  January 8, 2025 10:12 EST    Dictated Utilizing Dragon Dictation: Part of this note may be an electronic transcription/translation of spoken language to printed text using the Dragon Dictation System.

## 2025-07-14 ENCOUNTER — OFFICE VISIT (OUTPATIENT)
Dept: CARDIOLOGY | Facility: CLINIC | Age: 85
End: 2025-07-14
Payer: MEDICARE

## 2025-07-14 VITALS
DIASTOLIC BLOOD PRESSURE: 60 MMHG | SYSTOLIC BLOOD PRESSURE: 144 MMHG | BODY MASS INDEX: 31.2 KG/M2 | HEIGHT: 73 IN | HEART RATE: 80 BPM | WEIGHT: 235.4 LBS

## 2025-07-14 DIAGNOSIS — I70.1 RENAL ARTERY STENOSIS: ICD-10-CM

## 2025-07-14 DIAGNOSIS — I10 PRIMARY HYPERTENSION: Primary | ICD-10-CM

## 2025-07-14 DIAGNOSIS — E78.00 HYPERCHOLESTEROLEMIA: ICD-10-CM

## 2025-07-14 PROCEDURE — 3077F SYST BP >= 140 MM HG: CPT | Performed by: NURSE PRACTITIONER

## 2025-07-14 PROCEDURE — 3078F DIAST BP <80 MM HG: CPT | Performed by: NURSE PRACTITIONER

## 2025-07-14 PROCEDURE — 99214 OFFICE O/P EST MOD 30 MIN: CPT | Performed by: NURSE PRACTITIONER

## 2025-07-14 PROCEDURE — G2211 COMPLEX E/M VISIT ADD ON: HCPCS | Performed by: NURSE PRACTITIONER

## 2025-07-14 RX ORDER — VALSARTAN 320 MG/1
320 TABLET ORAL DAILY
Qty: 90 TABLET | Refills: 3 | Status: SHIPPED | OUTPATIENT
Start: 2025-07-14

## 2025-07-14 RX ORDER — TRIAMTERENE AND HYDROCHLOROTHIAZIDE 75; 50 MG/1; MG/1
1 TABLET ORAL DAILY
Qty: 90 TABLET | Refills: 3 | Status: SHIPPED | OUTPATIENT
Start: 2025-07-14

## 2025-07-14 RX ORDER — HYDRALAZINE HYDROCHLORIDE 100 MG/1
100 TABLET, FILM COATED ORAL 3 TIMES DAILY
Qty: 270 TABLET | Refills: 3 | Status: SHIPPED | OUTPATIENT
Start: 2025-07-14

## 2025-07-14 RX ORDER — ATORVASTATIN CALCIUM 10 MG/1
10 TABLET, FILM COATED ORAL DAILY
Qty: 90 TABLET | Refills: 3 | Status: SHIPPED | OUTPATIENT
Start: 2025-07-14

## 2025-07-14 NOTE — PROGRESS NOTES
Chief Complaint   Patient presents with    Follow-up     Cardiac management . Patient has kept a BP log and has copy with him today . He walks 3 miles daily for exercise .    LABS     Current labs 1- in chart    Med Refill     No refills needed today. Patient will have  pharmacy call when refills are needed         HPI:  HPI   Blayne Hunt is a 85y.o. male who was referred to us for uncontrolled hypertension.  He also was having chest pain and fatigue.  He was noted to be bradycardic. minoxidil was started and started tapering clonidine.  He is completely off clonidine.  His cardiac workup included a stress test which did not show any ischemia.  His echocardiogram was normal.  His renal artery ultrasound showed possible left renal artery stenosis but the CTA showed it was normal.      He returns today for follow-up visit. Labs 1/2025 with PCP; , triglycerides 101, HDL 39, LDL 87, TSH 4.4, Cr 1.32, GFR 54, normal liver enzymes. Patient denies chest pain, pressure, palpitations, tightness, dizziness, shortness of air. He continues to walk 3 miles almost every day. He states he may skip one day here and there but walks at least 5 or 6 times weekly.     Cardiac History:    Past Surgical History:   Procedure Laterality Date    CARDIOVASCULAR STRESS TEST  12/08/2005    @ [facility].10.38 Min. 244/78. R/O Septal Infarct.    CARDIOVASCULAR STRESS TEST  04/11/2012    9.30 Min. 13.5 METS. 87% THR. 190/72. EF > 60%. Negative    CARDIOVASCULAR STRESS TEST  09/28/2023    Lexiscan- EF 63%. Diapragmatic attenuation    ECHO - CONVERTED  12/07/2005    @[facility]. EF 65%. AO- 3.8    ECHO - CONVERTED  03/15/2012    EF 65%. AO- 4.0    ECHO - CONVERTED  09/28/2023    TLS. EF 60%. LA- 3.9. AO- 4.1. Trace-Mild MR. RVSP- 20 mmHg    OTHER SURGICAL HISTORY  09/28/2023    Renal Artery US- R/O (L) Renal artery stenosis    OTHER SURGICAL HISTORY  10/17/2023    CTA- No RA Stenosis. Gall stones       Current Outpatient Medications  "  Medication Sig Dispense Refill    aspirin 81 MG EC tablet Take 1 tablet by mouth Daily.      atorvastatin (LIPITOR) 10 MG tablet Take 1 tablet by mouth Daily. 90 tablet 3    hydrALAZINE (APRESOLINE) 100 MG tablet Take 1 tablet by mouth 3 (Three) Times a Day. 270 tablet 3    montelukast (SINGULAIR) 10 MG tablet Take 1 tablet by mouth Every Night. 90 tablet 3    triamterene-hydrochlorothiazide (MAXZIDE) 75-50 MG per tablet Take 1 tablet by mouth Daily. 90 tablet 3    valsartan (DIOVAN) 320 MG tablet Take 1 tablet by mouth Daily. 90 tablet 3     No current facility-administered medications for this visit.       Patient has no known allergies.    Past Medical History:   Diagnosis Date    Cancer     History of foot surgery     Hx of prostatectomy     Hyperlipidemia     Hypertension     Rheumatic fever     Seasonal allergies        Social History     Socioeconomic History    Marital status:    Tobacco Use    Smoking status: Former     Current packs/day: 0.00     Average packs/day: 1 pack/day for 15.0 years (15.0 ttl pk-yrs)     Types: Cigarettes     Start date: 1965     Quit date: 1980     Years since quittin.5     Passive exposure: Past    Smokeless tobacco: Never   Vaping Use    Vaping status: Never Used   Substance and Sexual Activity    Alcohol use: Not Currently    Drug use: Never    Sexual activity: Defer       Family History   Problem Relation Age of Onset    Hodgkin's lymphoma Mother     Heart disease Father     Other Other         7 siblings, one sister has AFib    Other Other         grandparents medical history unknown    Hypertension Son     Hypertension Son     Hypertension Son        Vitals:   /60 (BP Location: Left arm, Patient Position: Sitting, Cuff Size: Adult)   Pulse 80   Ht 185.4 cm (72.99\")   Wt 107 kg (235 lb 6.4 oz)   BMI 31.07 kg/m²     Physical Exam  Vitals and nursing note reviewed.   Constitutional:       Appearance: He is obese.   Neck:      Vascular: No carotid " bruit.   Cardiovascular:      Rate and Rhythm: Normal rate and regular rhythm.      Pulses: Normal pulses.      Heart sounds: Normal heart sounds. No murmur heard.     No friction rub. No gallop.   Pulmonary:      Effort: Pulmonary effort is normal.      Breath sounds: Normal breath sounds and air entry.   Musculoskeletal:      Right lower leg: No edema.      Left lower leg: No edema.   Skin:     General: Skin is warm and dry.      Capillary Refill: Capillary refill takes less than 2 seconds.   Neurological:      Mental Status: He is alert and oriented to person, place, and time.       Procedures     Assessment & Plan     Diagnoses and all orders for this visit:    1. Primary hypertension (Primary)  -     hydrALAZINE (APRESOLINE) 100 MG tablet; Take 1 tablet by mouth 3 (Three) Times a Day.  Dispense: 270 tablet; Refill: 3  -     triamterene-hydrochlorothiazide (MAXZIDE) 75-50 MG per tablet; Take 1 tablet by mouth Daily.  Dispense: 90 tablet; Refill: 3  -     valsartan (DIOVAN) 320 MG tablet; Take 1 tablet by mouth Daily.  Dispense: 90 tablet; Refill: 3    2. Hypercholesterolemia  -     atorvastatin (LIPITOR) 10 MG tablet; Take 1 tablet by mouth Daily.  Dispense: 90 tablet; Refill: 3    3. Renal artery stenosis    HTN  - BP elevated  - He has been keeping up BP log and brought it in with him today.  His BP has remained mostly between 132 and 147.  - Discontinue ramipril as he is on an ACE and an ARB currently.  Continue valsartan and Maxzide  - Increase hydralazine 100 mg 3 times daily    Hypercholesterolemia  - Lipids controlled  - Continue atorvastatin    Renal artery stenosis  - Renal artery ultrasound 9/2023 not suggestive of renal artery stenosis.  Left renal artery PSV upper limits of normal, small bilateral renal cysts  - Abdominal CTA 10/2023 to evaluate renal arteries further revealed no renal artery stenosis.  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 No further testing recommended at this time. Discontinue ramipril and increase hydralazine to 100 mg 3 times daily.  Continue keeping blood pressure log and notify me if blood pressure remains elevated after medication change    Visit Diagnoses:    ICD-10-CM ICD-9-CM   1. Primary hypertension  I10 401.9   2. Hypercholesterolemia  E78.00 272.0   3. Renal artery stenosis  I70.1 440.1       Meds Ordered During Visit:  New Medications Ordered This Visit   Medications    atorvastatin (LIPITOR) 10 MG tablet     Sig: Take 1 tablet by mouth Daily.     Dispense:  90 tablet     Refill:  3    hydrALAZINE (APRESOLINE) 100 MG tablet     Sig: Take 1 tablet by mouth 3 (Three) Times a Day.     Dispense:  270 tablet     Refill:  3    triamterene-hydrochlorothiazide (MAXZIDE) 75-50 MG per tablet     Sig: Take 1 tablet by mouth Daily.     Dispense:  90 tablet     Refill:  3    valsartan (DIOVAN) 320 MG tablet     Sig: Take 1 tablet by mouth Daily.     Dispense:  90 tablet     Refill:  3       Follow Up Appointment:   Return in about 6 months (around 1/14/2026), or if symptoms worsen or fail to improve.           This document has been electronically signed by JEAN CARLOS Spencer  July 14, 2025 13:17 EDT    Dictated Utilizing Dragon Dictation: Part of this note may be an electronic transcription/translation of spoken language to printed text using the Dragon Dictation System.

## 2025-07-21 DIAGNOSIS — R06.02 SHORTNESS OF BREATH: ICD-10-CM

## 2025-07-22 RX ORDER — MONTELUKAST SODIUM 10 MG/1
10 TABLET ORAL
Qty: 90 TABLET | Refills: 3 | OUTPATIENT
Start: 2025-07-22